# Patient Record
Sex: MALE | Race: WHITE | Employment: FULL TIME | URBAN - NONMETROPOLITAN AREA
[De-identification: names, ages, dates, MRNs, and addresses within clinical notes are randomized per-mention and may not be internally consistent; named-entity substitution may affect disease eponyms.]

---

## 2024-01-05 ENCOUNTER — TELEPHONE (OUTPATIENT)
Dept: FAMILY MEDICINE CLINIC | Age: 39
End: 2024-01-05

## 2024-01-05 ENCOUNTER — OFFICE VISIT (OUTPATIENT)
Dept: FAMILY MEDICINE CLINIC | Age: 39
End: 2024-01-05
Payer: COMMERCIAL

## 2024-01-05 VITALS
BODY MASS INDEX: 25.76 KG/M2 | OXYGEN SATURATION: 98 % | HEIGHT: 71 IN | RESPIRATION RATE: 18 BRPM | HEART RATE: 82 BPM | TEMPERATURE: 98.1 F | SYSTOLIC BLOOD PRESSURE: 124 MMHG | DIASTOLIC BLOOD PRESSURE: 82 MMHG | WEIGHT: 184 LBS

## 2024-01-05 DIAGNOSIS — B83.9 WORMS IN STOOL: Primary | ICD-10-CM

## 2024-01-05 PROCEDURE — 99213 OFFICE O/P EST LOW 20 MIN: CPT | Performed by: NURSE PRACTITIONER

## 2024-01-05 NOTE — TELEPHONE ENCOUNTER
Discussed with patient via phone today that treatment will be based upon stool culture report.  The patient was upset and adamant about tapeworm treatment at this time.  It was explained to the patient that medication, dosage, and duration is based upon the specific type of worm.  It was also explained to the patient if he would become symptomatic (including feeling ill, abdominal discomfort, diarrhea, blood in stool, fever) or would like second opinion the patient is recommended to go to the ED for reevaluation.

## 2024-01-06 NOTE — PROGRESS NOTES
24  López Beavers : 1985 Sex: male  Age 38 y.o.    Subjective:  Chief Complaint   Patient presents with    Other       HPI:   López Beavers , 38 y.o. male presents to the clinic for evaluation of worm noted in stool today. The patient has not taken any treatment for symptoms. The patient reports unchanged symptoms over time. The patient also denies headache, fever, chest pain, abdominal pain, shortness of breath, and nausea / vomiting / diarrhea.    ROS:   Unless otherwise stated in this report the patient's positive and negative responses for review of systems for constitutional, eyes, ENT, cardiovascular, respiratory, gastrointestinal, neurological, , musculoskeletal, and integument systems and related systems to the presenting problem are either stated in the history of present illness or were not pertinent or were negative for the symptoms and/or complaints related to the presenting medical problem.  Positives and pertinent negatives as per HPI.  All others reviewed and are negative.      PMH:     Past Medical History:   Diagnosis Date    Fracture     right scaphoid       Past Surgical History:   Procedure Laterality Date    FRACTURE SURGERY Left 4/15/2015    scaphoid closed reductionand percutaneous pinning    WISDOM TOOTH EXTRACTION         History reviewed. No pertinent family history.    Medications:     Current Outpatient Medications:     Probiotic Product (PROBIOTIC & ACIDOPHILUS EX ST PO), Take 1 tablet by mouth daily., Disp: , Rfl:     Allergies:   No Known Allergies    Social History:     Social History     Tobacco Use    Smoking status: Never    Smokeless tobacco: Never   Substance Use Topics    Alcohol use: Yes     Comment: occasional    Drug use: No       Physical Exam:     Vitals:    24 1639   BP: 124/82   Pulse: 82   Resp: 18   Temp: 98.1 °F (36.7 °C)   TempSrc: Temporal   SpO2: 98%   Weight: 83.5 kg (184 lb)   Height: 1.803 m (5' 11\")       Physical Exam (PE)    
Parasite Examination; Future        - Disposition: Home    - Educational material printed for patient's review and were included in patient instructions. After Visit Summary was given to patient at the end of visit.    - Plan of care discussed with collaborating physician today. Plan of care discussed with the patient which included treatment will be based upon stool culture report. The patient was upset and concerned about not receiving treatment for tapeworm today.  It was explained to the patient that the medication, dosage, and duration is based upon the specific type of worm. It was also explained to the patient if he would become symptomatic (including feeling ill, abdominal discomfort, diarrhea, blood in stool, fever, nausea / vomiting) or would like a second opinion the patient is recommended to go to the ED for reevaluation.    - The patient is to follow-up with PCP in the next 2-3 days for reevaluation. Red flag symptoms were also discussed with the patient today. If symptoms develop the patient is to go directly to the emergency department for reevaluation and treatment. Pt verbalizes understanding and is in agreement with plan of care. All questions answered.    SIGNATURE: DAWOOD Maldonado-CNP    *NOTE: This report was transcribed using voice recognition software. Every effort was made to ensure accuracy; however, inadvertent computerized transcription errors may be present.

## 2024-01-10 DIAGNOSIS — B83.9 WORMS IN STOOL: ICD-10-CM

## 2024-01-11 LAB
SEND OUT REPORT: NORMAL
TEST NAME: NORMAL

## 2024-01-17 LAB
SEND OUT REPORT: NORMAL
TEST NAME: NORMAL